# Patient Record
Sex: MALE | URBAN - METROPOLITAN AREA
[De-identification: names, ages, dates, MRNs, and addresses within clinical notes are randomized per-mention and may not be internally consistent; named-entity substitution may affect disease eponyms.]

---

## 2019-09-27 LAB
CREATININE, URINE: 200
MICROALBUMIN/CREAT 24H UR: 150 MG/G{CREAT}
MICROALBUMIN/CREAT UR-RTO: >300

## 2020-05-13 LAB
AVERAGE GLUCOSE: 151
CHOLESTEROL, TOTAL: 153 MG/DL
CHOLESTEROL/HDL RATIO: 6.4
HBA1C MFR BLD: 6.9 %
HDLC SERPL-MCNC: 24 MG/DL (ref 35–70)
LDL CHOLESTEROL CALCULATED: 93 MG/DL (ref 0–160)
NONHDLC SERPL-MCNC: ABNORMAL MG/DL
TRIGL SERPL-MCNC: 178 MG/DL
VLDLC SERPL CALC-MCNC: 36 MG/DL

## 2020-09-15 ENCOUNTER — TELEPHONE (OUTPATIENT)
Dept: PHARMACY | Facility: CLINIC | Age: 60
End: 2020-09-15

## 2020-09-15 NOTE — LETTER
Татьяна 2  [address], [address]  Phone: toll free [phone] option [address] [facility]  [address]  [facility] [address]           09/16/20     Dear John Dillard! You have successfully enrolled in the Be Well With Diabetes program for 2020. What you receive  Beginning within the next 1 to 2 weeks, you will begin receiving up to $300 in waived copays for specific medications and pharmacy-related supplies purchased through our home delivery pharmacy, [address] [place] [address]. A list of eligible medications and pharmacy supplies can be found at [employer] under Be Well With Diabetes. In addition, youll receive advice and help from our pharmacists, associate care management team and diabetes educators. (And, if you also participate in the Be Well program, you can earn points and Lifestyle Management or Health Management program credit, if applicable.)    What you need to do  To maintain your benefit this year and remain eligible next year, complete the following requirements:      *Can be satisfied through [facility] Screening on-site. **Requirements to enroll must be completed within 6 months of enrollment date **Pneumonia vaccine is dependent on previous immunization and your age. Remember, program requirements must be completed by deadlines shown. If not, your benefit may be terminated, and you will not be eligible to participate again until the following year. To keep you on track, well review your [employer] account and send reminders for action. (If you dont have a [address], submit documentation to [email or web address] or by fax to [phone].)    Congratulations and thank you for taking steps to improve your health and to Be Well With Diabetes. [address] [facility]  Phone: [phone], option 7  Email: [email or web address] Fax: [phone]

## 2020-09-15 NOTE — TELEPHONE ENCOUNTER
Pharmacy Pop Care Documentation:   [facility] Employee:  Patient is missing the following requirements: DM Program Application; Diagnosis: DM Type One or Type Two; Provider Documentation of DM Visit; A1C. [employer] code sent via email provided. Per patient he faxed his DM Program Application along with his Health Provider Diabetes Screening Form 2 weeks ago and he has the confirmation receipt. I advised him that we have not received his information. Upon further discussion with the patient he faxed the information to an incorrect fax number - per patient he faxed the information to the following number: [phone] which is the phone number listed on the application to contact our department. Patient was given the correct fax number: [phone] along with our e-mail: [email or web address] and asked to send the information as soon as possible. Patient verified understanding but was unsure if he completed the DM Program application and requested that it be e-mailed to him at Rafal Lerner. [email or web address] Maguire Application e-mailed to patient as requested on 9/15/20.     Tristan Rossi, [address] [email or web address]   Department, toll free: [phone], option 7

## 2020-09-16 ENCOUNTER — CLINICAL DOCUMENTATION (OUTPATIENT)
Dept: PHARMACY | Facility: CLINIC | Age: 60
End: 2020-09-16

## 2020-09-16 NOTE — TELEPHONE ENCOUNTER
Dm Program Application and required documentation received. Spoke to patient and confirmed that the DM Program application and required documentation received. 2020 Annual Pharmacist Appointment scheduled for 9/21/20 at 7:30 AM.    Pharmacy Pop Care Documentation:   [facility] Employee:  Patient has completed all the requirements and therefore will be enrolled in the DM Program.    Application received: 9/57/45  Application scanned. Letter mailed to patient.     Vitaly Walden, Via [facility]   Department, toll free: [phone], option 7

## 2020-09-16 NOTE — PROGRESS NOTES
Pharmacy Pop Care Documentation:      The application for [facility] Donald for enrollment into the diabetes management program has been reviewed and accepted on 9/16/20.     Selma Hobson

## 2020-09-16 NOTE — PROGRESS NOTES
Pharmacy Pop Care Documentation:     AVS received for required office visit on: 5/13/20: Emma Saucedo NP

## 2020-09-21 ENCOUNTER — SCHEDULED TELEPHONE ENCOUNTER (OUTPATIENT)
Dept: PHARMACY | Facility: CLINIC | Age: 60
End: 2020-09-21

## 2020-09-21 RX ORDER — LOSARTAN POTASSIUM 100 MG/1
100 TABLET ORAL DAILY
COMMUNITY

## 2020-09-21 RX ORDER — PEN NEEDLE, DIABETIC 30 GX3/16"
NEEDLE, DISPOSABLE MISCELLANEOUS
COMMUNITY

## 2020-09-21 RX ORDER — ISOPROPYL ALCOHOL 0.7 ML/1
SWAB TOPICAL
COMMUNITY

## 2020-09-21 RX ORDER — INSULIN GLARGINE AND LIXISENATIDE 100; 33 U/ML; UG/ML
INJECTION, SOLUTION SUBCUTANEOUS
COMMUNITY

## 2020-09-21 RX ORDER — CARVEDILOL 25 MG/1
25 TABLET ORAL 2 TIMES DAILY
COMMUNITY

## 2020-09-21 RX ORDER — FUROSEMIDE 20 MG/1
20 TABLET ORAL 2 TIMES DAILY
COMMUNITY

## 2020-09-21 RX ORDER — LANCETS
EACH MISCELLANEOUS
COMMUNITY

## 2020-09-21 RX ORDER — ATORVASTATIN CALCIUM 20 MG/1
20 TABLET, FILM COATED ORAL DAILY
COMMUNITY

## 2020-09-21 RX ORDER — GLUCOSAMINE HCL/CHONDROITIN SU 500-400 MG
CAPSULE ORAL
COMMUNITY

## 2020-09-21 NOTE — LETTER
Татьяна 2  [address] [facility], [address]  Phone: [phone], option 2101 [facility]  [address] [place] [address]           09/21/20     Dear Ernesto Gallardo,    Thank you for taking the time to speak with us for the [address] [place] [address] Diabetes Program! Here is a summary of some things we talked about:    Current medications eligible for copay waiver, up to $300, through [address] Delivery:  - losartan, atorvastatin  Orien Josh not covered; if issues getting for $0 with coupon consider changing to covered insulin (Lantus, Jonny Grange) and covered GLP-1 (Victoza, Trulicity, Ozempic)  - Generic insulin pen needles  - Prodigy (or Agamatrix) meter and supplies  - prescription fax requests sent to your provider: Prodigy testing supplies, pen needles, losartan, atorvastatin, furosemide, carvedilol, alcohol swabs. Please follow up if you do not hear anything from the pharmacy within one week  Home delivery pharmacy: [phone], option 0 - please allow 2 weeks for processing and shipping prescriptions. Requirements to be completed: please provide documentation of completed requirements  - Ongoing requirements (due by 12/31/2020): Office visit with provider for diabetes (2nd in 2020), diabetes education (if A1c becomes over 8%), A1c (2nd in 2020), Urine microalbumin, Pneumococcal vaccination: documentation of Pneumovax 23 vaccination and Influenza vaccination for [id number]     Please work with your provider to ensure that the 2020 requirements are completed by the appropriate dates. Please feel free to contact us with questions. (continued on next page)  Thank you,  Татьяна 2 Team  Telephone: [phone] Option #7   Fax: [phone]  Email: [email or web address]

## 2020-09-21 NOTE — LETTER
HOME DELIVERY PRESCRIPTION REQUEST  DIABETES PROGRAM  Prescriber:  [address], [facility] [address] [facility]  Phone: [phone] Fax: [phone]     Patient:  Natalia Desouza 1960  [place]  [address] [place] [address] [place] [id number] (home)      Rationale:   Patient is enrolled in the [facility] Diabetes Management Program. Copays for certain diabetes related medications and supplies will be waived if filled through [address] ([address]) [address] while participating in the program. 90 day supplies are preferred. Covered Medication(s) for Patient[de-identified]    Medication: atorvastatin 20 mg tab      Si tab PO daily                      #: 90     R: 1  Medication: losartan 100 mg tab          Si tab PO daily                      #: 90     R: 1  Medication: 31G x 5 mm pen needles  Sig: Use once daily as directed   #: 100    R: 3       Prescriber Response:    Prescription(s) approved (please [place] one):  YES  NO    Other response: ________________________________________      ______________________________________   __________________  Authorized By       Date     Pharmacy: 5 [facility]                        Phone:  [phone]    Konstantin Adams, [address] [facility]  Fax:  [phone]    [address]     The information transmitted is intended only for the person or entity to which it is addressed and may contain confidential and/or privileged material. Any review, retransmission, dissemination or other use of, or taking of any action in reliance upon, this information by persons or entities other than the intended recipient is prohibited.  This document contains information covered under the Privacy Act, 5 (a), and/or the [facility] and Accountability Act ([address]) and its various implementing regulations and must be protected in accordance with those provisions. If you received this in error, please contact the sender and delete the material from any computer. HOME DELIVERY PRESCRIPTION REQUEST  Prescriber:  Reena [address], [facility]  Phone: [phone] Fax: [phone]     Patient:  Jannette Storyet 1960  [facility] [address]  [phone] (home)      Rationale:   Patient would like to get maintenance medications from preferred home delivery pharmacy with new insurance. 90 day supplies are preferred. Covered Medication(s) for Patient[de-identified]    Medication: carvedilol 25 mg tab         Si tab PO BID                 #: 180       R: 1  Medication: furosemide 20 mg tab      Si tab PO BID                 #: 180       R: 1  Medication: alcohol 70% prep pads    Sig: use 4 times per day       #: 400       R: 3       Prescriber Response:    Prescription(s) approved (please [place] one):  YES  NO    Other response: ________________________________________      ______________________________________   __________________  Authorized By       Date     Pharmacy: [facility]                        Phone:  [phone]    Konstantin Adams, [facility]  Fax:  [phone]    [address]     The information transmitted is intended only for the person or entity to which it is addressed and may contain confidential and/or privileged material. Any review, retransmission, dissemination or other use of, or taking of any action in reliance upon, this information by persons or entities other than the intended recipient is prohibited. This document contains information covered under the Privacy Act, 5 (a), and/or the [employer] and Accountability Act ([address]) and its various implementing regulations and must be protected in accordance with those provisions.  If you received this in error, please contact the sender and delete the material from any computer.

## 2020-09-21 NOTE — TELEPHONE ENCOUNTER
[facility] Employee Diabetes Program  =================================================================  Natalia Desouza is a 61 y.o. male enrolled in the [facility] Employee Diabetes Program. New enrollee - [address]  Medications:  No current outpatient medications on file. No current facility-administered medications for this visit. Per application:  Atorvastatin 20 mg tablet nightly  Carvedilol 25 mg tab BID  Furosemide 20 mg tab BID  One touch lancets 2-3 times daily  Test strips 2-3 times daily  Pen needles 31G x 5mm  Alcohol pads  Was on Xultophy but is changed to [employer]. Xultophy was not covered so changed to [employer] and says got coupon. Does not have yet so unsure of dose. Per claims:  Losartan 100 mg tab 1 daily (filled 8/12/20) - states taking this  Xultophy (degludec + liraglutide) 100-3.6 mg/mL pen (denied); Victoza?; Soliqua? (Lantus + lixixenatide) - see above    Current Pharmacy: [facility]  Current testing supplies/frequency: discussed options - would like Prodigy  Pen needles/syringes: 31G x 5mm - will need Rx at [facility]    Allergies: Allergies not on file     Vitals/Labs:  BP Readings from Last 3 Encounters:   No data found for BP     Per 9/16/20 scanned document:  /92 mmHg    No results found for: LABMICR, [id number]     Lab Results   Component Value Date    LABA1C 6.9 05/13/2020     Lab Results   Component Value Date    CHOL 153 05/13/2020    TRIG 178 05/13/2020    HDL 24 (A) 05/13/2020    LDLCALC 93 05/13/2020     No results found for: ALT, AST  The ASCVD Risk score (Jelani Esparza et al., 2013) failed to calculate for the following reasons: The systolic blood pressure is missing    The smoking status is invalid    Unable to determine if patient is Non-      No results found for: CREATININE  CrCl cannot be calculated (No successful lab value found. ).      Per 9/16/20 scanned results eGFR 40 mL/min/1.73m2    Immunizations: There is no immunization history on file for this patient. Social History:  Social History     Tobacco Use    Smoking status: Not on file   Substance Use Topics    Alcohol use: Not on file     ASSESSMENT:  Ongoing Program Requirements (Y indicates has completed for the year, N indicates needs to be completed by 12/31/2020): No - OV with provider for DM (2nd) - hospitals has appt next month; did discuss deferring for 2020 requirement but if going asked if he could please provide documentation  Yes - ACC/diabetes educator visit (will need if A1c becomes over 8%)  No - A1c (2nd) - hospitals has appt next month; did discuss deferring for 2020 requirement but if going asked if he could please provide documentation  Yes - Lipid panel  No - Urine microalbumin (discussed getting this as requirement)  No - Pneumococcal vaccination: Pneumovax 23 or documentation - pt states had [facility] at [facility], advised will need documentation to update program records  No - Influenza vaccination for Fall 2020  Yes - Medication adherence over 70% n/a since on insulin  Yes - On statin or contraindication(s) atorvastatin  Yes - On ACEi/ARB or contraindication(s) losartan     Formulary Medication Review:  Non-formulary or medications with cost-effective alternatives: would like to change to preferred glucometer/supplies. Current medications eligible for copay waiver, up to $300, through [address] [place]:  - losartan, atorvastatin  - Soliqua not covered; if issues with coupon consider changing to covered insulin (Lantus, Tresiba) and covered [id number] (Victoza, Trulicity, Ozempic)  - Generic ([facility] pharmacy-stocked) insulin syringes and pen needles  - Agamatrix or Prodigy meter and supplies     Diabetes Care:   - Glycemic Goal: <7.0%.  Is at blood glucose goal.    Other Considerations:  - Blood Pressure Goal: BP less than 140/90 mmHg due to history of DM: Is not at blood pressure goal (with 1 reading available). On losartan 100 mg daily, carvedilol 25 mg BID, furosemide 20 mg daily. To f/u for recheck at visit  - Lipids: is on moderate intensity statin with LDL under 100 mgd/L. Unable to calculate ASCVD risk score. PLAN:  - Consideration(s) for provider: fax sent to provider Taylor Springer NP) - see letters  · Prodigy - up to 3 times daily  · 90 day Rxs to [facility]: atorvastatin, losartan, pen needles  · Patient would also like maintenance Rxs to [facility]: alcohol swabs (4 times per day), furosemide, carvedilol; (Ange Nancie already being filled by [facility])  - DM program gaps identified:   · Ongoing requirements: OV with provider for DM (2nd), ACC/diabetes educator visit (if A1c over 8%), A1c (2nd), Urine microalbumin, Pneumococcal vaccination: documentation of Pneumovax 23 vaccination and Influenza vaccination for [id number]   - Education to patient: as above and:   · How to use [facility] including calling 7-10 days in advance to initiate refills  · Soliqua coverage (not covered) but if not covered by coupon fill for individual agents  · Adjusted requirements due to pandemic but encouraged to complete since upcoming visit  · Letter sent as pt does not have active MyChart  - Follow up: PCP for identified gaps or as scheduled below  - Upcoming appointments:   Future Appointments   Date Time Provider Pankaj Trevino   9/21/2020  7:30 AM SCHEDULE, [facility], PharmD, [address] [facility] Pharmacist  Dept: [phone] (toll free [phone], option 7)     For Pharmacy Admin Tracking Only    PHSO: Yes  Total # of Interventions Recommended: 2  - Updated Order #: 1 Updated Order Reason(s):  Other  Recommended intervention potential cost savings: 1  Total Interventions Accepted: 1  Time Spent (min): 60

## 2020-09-21 NOTE — LETTER
PRESCRIPTION REQUEST  DIABETES PROGRAM  Prescriber:  [address], [facility]  Phone: [phone] Fax: [phone]     Patient:  Roger Leaver 1960  [facility]  [address] [place] [id number] (home)      Rationale:   Davina Kothari is the preferred blood glucose monitor. Meter, strips and lancets will be covered at $0 copay through the [facility] Diabetes Management Program.       Rx: Prodigy Blood Glucose Monitor   #: 1  Refills: 0  Rx: Prodigy Blood Glucose Test Strips   #: 300  Refills: 3  Rx: Prodigy Lancets     #: 300  Refills: 3    Directions: Use 3 time(s) daily or as directed to test blood glucose         Prescriber Response:    Prescription(s) approved (please [place] one):  YES  NO    Other response: ________________________________________      ______________________________________   __________________  Authorized By       Date     Pharmacy: [facility]                        Phone:  [phone]    Konstantin Adams, [address] [facility]  Fax:  [phone]    [address]     The information transmitted is intended only for the person or entity to which it is addressed and may contain confidential and/or privileged material. Any review, retransmission, dissemination or other use of, or taking of any action in reliance upon, this information by persons or entities other than the intended recipient is prohibited. This document contains information covered under the Privacy Act, 5 (a), and/or the [employer] and Accountability Act ([address]) and its various implementing regulations and must be protected in accordance with those provisions. If you received this in error, please contact the sender and delete the material from any computer.

## 2020-09-23 NOTE — TELEPHONE ENCOUNTER
Response received from [facility] confirming pt is able to receive Soliqua at $0 copay with coupon.

## 2020-11-19 ENCOUNTER — TELEPHONE (OUTPATIENT)
Dept: PHARMACY | Facility: CLINIC | Age: 60
End: 2020-11-19

## 2020-11-19 NOTE — TELEPHONE ENCOUNTER
Pharmacy Pop Care Documentation:   Called patient with reminder for requirements for Diabetes Management Program.     According to our records, patient is missing the following requirement(s) that must be completed by December 31, 2020:     Yearly urine microalbumin test - we will honor 2019 results     Patient not available at the time of call. Left message on home TAD with the above information. [employer] account is pending. Letter sent.      Alonso Garcia, Via [facility]   Department, toll free: [phone], option 7

## 2020-11-19 NOTE — LETTER
Татьяна 2  [address], [address]  Phone: toll free [phone] option 21[address] [facility] [place] [address]           11/19/20     Dear Wing Salguero,    This is just a friendly reminder regarding your enrollment in the [facility] Diabetes Management Program.     According to our records, you are missing the following requirement(s) that must be completed by December 31, 2020 to maintain enrollment in the  Diabetes Management Program and to be re-enrolled for 2021:      Yearly urine microalbumin test - we will honor 2019 results    Sid Singleton will have to submit documentation of completion of requirements if your Physician/diabetes educator/ambulatory care coordinator does not use the [facility] electronic charting system or if you have your Vaccinations, lab tests or urine tests done outside of [facility]. Return the documentation to [email or web address] or by fax at [phone]     If requirements(s) are not met by December 31, 2020 you may be disqualified from the program and the credit valued between $300 to $600 towards your diabetic medications and supplies will be revoked. You will be able to reapply the following calendar year. [employer]   [phone] Option #7   Email: [email or web address]   Fax Number: [phone]

## 2020-12-14 ENCOUNTER — TELEPHONE (OUTPATIENT)
Dept: PHARMACY | Facility: CLINIC | Age: 60
End: 2020-12-14

## 2020-12-14 NOTE — TELEPHONE ENCOUNTER
**2nd Attempt to contact patient for missing requirements for the DM Program**    Pharmacy Pop Care Documentation:   Called patient with reminder for requirements for Diabetes Management Program.     According to our records, patient is missing the following requirement(s) that must be completed by December 31, 2020:      Yearly urine microalbumin test - we will honor 2019 results     Patient not available at the time of call. Left message on home TAD with the above information.     [employer] account is pending.   Letter sent on 11/19/20.     Trixie Grant, Via [employer]   Department, toll free: [phone], option 7

## 2020-12-14 NOTE — LETTER
Татьяна Mckeon  [address], [address]  Phone: toll free [phone] option 130 [facility]  [address] [place] [address]           12/21/20     Dear Stuart Zaman,    Thanks so much for taking the first step towards better health. Congratulations! You have completed the requirements needed to maintain enrollment in the [facility] Diabetes Management Program for 2020 and you will automatically be re-enrolled into the [facility] Diabetes Management Program for 2021. You do not need to re-apply for 2021. Your waived copay allowance for specific medications and pharmacy-related supplies received via mail will reset to $600 on 1/01/2021. To maintain your benefit throughout the year and be eligible to receive the benefit next year, you must make sure you complete all the following ongoing requirements:               Requirements to be completed between Jan. 1 and June 30:     1. Visit with your physician (First yearly visit)  2. Meet with a Татьяна Mckeon Team Member by phone or in person at a hospital location (This visit may be conducted prior to the start of the requirements period.)  3. First A1C               Requirements to be completed between July 1 and Dec. 31  1. Visit with your physician (Second yearly visit)  2. Second A1C  3. Flu vaccination               Requirements to be completed throughout year:     1. Lipid panel (once yearly)  2. Urine albumin (once yearly)  3. Pneumonia Vaccination (as indicated)  4. Take diabetes medication as prescribed as well as cholesterol (Statin) or high blood pressure (ACE/ARB), if needed. 5. 70% adherence is required of diabetes medications  6. Provide documentation if cholesterol and/or high blood pressure medications are not needed.                Requirements if A1C is greater than 8 percent: Engage with a [facility] diabetes educator at one of our hospital locations or an ambulatory care coordinator by phone, if your A1c is greater than 8 percent. You will have to submit documentation of completion of requirements if your Physician/diabetes educator/ambulatory care coordinator does not use the [facility] electronic charting system or if you have your lab/urine tests done outside of [facility]. Return the documentation to [email or web address] or by fax at [phone]     If requirements(s) are not met by the dates listed above you will be disqualified from the program and the credit valued at $600 towards your diabetic medications and supplies will be revoked. You will be able to reapply the following calendar year. Татьяна Mckeon Team   [phone] Option #7   Email: [email or web address]   Fax Number: [phone]

## 2020-12-18 NOTE — TELEPHONE ENCOUNTER
Patient called to verify receipt of urine labs sent from office. Verified 2019 urine results in Clinical pool and will be attached to chart. Patient has completed 2020 requirements.

## 2020-12-21 NOTE — TELEPHONE ENCOUNTER
2019 Urine Albumin Results Received. Patient is up to date with requirements for the DM Program and will be re-enrolled into the program for 2021. Letter mailed to patient.

## 2021-01-06 ENCOUNTER — TELEPHONE (OUTPATIENT)
Dept: PHARMACY | Facility: CLINIC | Age: 61
End: 2021-01-06

## 2021-01-06 NOTE — TELEPHONE ENCOUNTER
Called patient to schedule pharmacist appointment to discuss medications for Diabetes Management Program.     Scheduled 1/13 at [address] [place] [address].   Pharmacy Leo Sandoval [facility]  Department, toll free: [phone], option 7

## 2021-01-13 ENCOUNTER — SCHEDULED TELEPHONE ENCOUNTER (OUTPATIENT)
Dept: PHARMACY | Facility: CLINIC | Age: 61
End: 2021-01-13

## 2021-01-13 NOTE — TELEPHONE ENCOUNTER
[facility] Employee Diabetes Program - Be Well With Diabetes  =================================================================  Namita Jimenez is a 64 y.o. male enrolled in the [address] [place] [address] Diabetes Program. Patient provided Zachary Landry with verbal consent to remain in the program for this year. Patient enrolled 10/01/2020. Medications:  Medication Sig Notes    atorvastatin (LIPITOR) 20 MG tablet Take 20 mg by mouth daily     carvedilol (COREG) 25 MG tablet Take 25 mg by mouth 2 times daily     furosemide (LASIX) 20 MG tablet Take 20 mg by mouth 2 times daily     Alcohol Swabs (ALCOHOL WIPES) 70 % PADS Use 4 times daily     blood glucose monitor strips Use to test blood sugar 2 to 3 times daily     Lancets Thin MISC Use to test blood sugar 2 to 3 times daily     Insulin Pen Needle (PEN NEEDLES) 31G X 5 MM MISC Use as directed     losartan (COZAAR) 100 MG tablet Take 100 mg by mouth daily     Insulin Glargine-Lixisenatide (SOLIQUA) 100-33 UNT-MCG/ML SOPN Use as directed. Allergies: Penicillin, sulfa antibiotics, strawberry    Current Pharmacy: [facility]  Current testing supplies/frequency: Prodigy BID  Pen needles/syringes: getting through [facility]    Allergies: Allergies   Allergen Reactions    Penicillins Hives    Strawberry Flavor Hives    Sulfa Antibiotics Hives      Vitals/Labs:  BP Readings from Last 3 Encounters:   No data found for BP     No results found for: LABMICR, [id number]  Lab Results   Component Value Date    LABA1C 6.9 05/13/2020     Lab Results   Component Value Date    CHOL 153 05/13/2020    TRIG 178 05/13/2020    HDL 24 (A) 05/13/2020    LDLCALC 93 05/13/2020     No results found for: ALT, AST  The ASCVD Risk score (Kylie Weir et al., 2013) failed to calculate for the following reasons:     The systolic blood pressure is missing    The smoking status is invalid    Unable to determine if patient is Non-      No results found for: CREATININE  CrCl cannot be calculated (No successful lab value found. ). From scanned results:  Date: 5/19/20  Scr 1.8   eGFR 40    Immunizations:  Immunization History   Administered Date(s) Administered    Influenza, MDCK Quadv, IM, PF (Flucelvax 4 yrs and older) 10/10/2020      Social History:  Social History     Tobacco Use    Smoking status: Not on file   Substance Use Topics    Alcohol use: Not on file     ASSESSMENT:  Initial Program Requirements (Y indicates has completed for the year, N indicates needs to be completed by 7/1/2021): No - Office Visit with provider for DM (1st)  No - A1c (1st)     Ongoing Program Requirements (Y indicates has completed for the year, N indicates needs to be completed by 12/31/2021): No - Office Visit with provider for DM (2nd)  n/a - ACC/diabetes educator visit (if A1c over 8%)  No - A1c (2nd)  No - Lipid panel  No - Urine microalbumin  No - Pneumococcal vaccination: Patient states he received November 2019. He will fax confirmation of this vaccine. No - Influenza vaccination for Fall 2021  n/a - Medication adherence over 70%  Yes - On statin or contraindication(s) atorvastatin 20 mg  Yes - On ACEi/ARB or contraindication(s) losartan 100 mg tablets     Formulary Medication Review:  Non-formulary or medications with cost-effective alternatives: Terra Holland, has used copay savings card and paid $0 in December 2020. Discussed if has troubles with this in the future can call if needs assistance looking for formulary options. Current medications eligible for copay waiver, up to $600, through [employer]:  - atorvastatin, furosemide, losartan  - Generic ([facility] pharmacy-stocked) insulin syringes and pen needles  - Agamatrix or Prodigy meter and supplies  - Dexcom G6 supplies  - Freestyle Alin supplies     Diabetes Care:   Type 2 DM   - Glycemic Goal: <7.0%.  Is at blood glucose goal.  - Any episodes of hypoglycemia? No   Treatment of hypoglycemia (low blood sugars): if sugar is below 80 mg/dL, treat with 15 grams of simple sugar [rapid acting carb] (3-4 glucose tablets, 4 oz of regular juice, 1/2 can of pop, 5 sugar-containing candies, 1 Tablespoon of honey). RECHECK in 15 minutes. If above 80 mg/dL, have small meal or snack. If not above 80 mg/dL, repeat the 15 grams of simple carbs until above 80 mg/dL. - Eye exam current (within one year): yes  - Foot exam current (within one year): yes  - Current exercise: walks almost daily for 20-30 minutes    Last Refills:  Soliqua: 12/21/20  Pen needles: 10/12/20  Losartan 100 mg: 10/12/20  Furosemide: 10/12/20  Carvedilol:10/12/20  Atorvastatin: 10/12/20  Prodigy meter and supplies: 9/30/20    PLAN:  - DM program gaps identified:   · Initial requirements: Office Visit with provider for DM (1st) and A1c (1st)   · Ongoing requirements: Office visit with provider for DM (2nd), ACC/diabetes educator visit (if A1c over 8%), A1c (2nd), Lipid panel, Urine microalbumin, Pneumococcal vaccination: [id number], Influenza vaccination for [id number] and Medication adherence over 70%   - Education to patient: MyChart Activation Code sent to patient, Addressed diet and exercise, Discussed foot care, Reminded to get yearly retinal exam and Discussed ways to avoid symptomatic hypoglycemia   - Follow up: endocrinologist  - Upcoming appointments:   No future appointments.     Teagan EncisoD, [facility], [address]  Direct: [phone]  Department, toll free: [phone], option 7     CLINICAL PHARMACY CONSULT: MED RECONCILIATION/REVIEW ADDENDUM  For Pharmacy Admin Tracking Only  PHSO: Yes  Total # of Interventions Recommended: 0  Recommended intervention potential cost savings: 1  Total Interventions Accepted: 0  Time Spent (min): 20

## 2021-01-13 NOTE — LETTER
Татьяна [address]  [address], [address]  Phone: [phone], option [address] [facility]  [address] [place] [id number]           01/13/21     Dear Namita Jimenez,    You are currently enrolled in the Be Well with Diabetes program.    Current medications eligible for copay waiver, up to $600, through [email or web address] [place]:  - atorvastatin, furosemide, losartan  - Generic ([facility] pharmacy-stocked) insulin syringes and pen needles  - Agamatrix or Prodigy meter and supplies    Home Delivery pharmacy: [phone], option 0 - please allow 2 weeks for processing and shipping prescriptions    To sign up for an online [employer] [facility] account, you can visit this website: [email or web address] or search for \"[facility]\" in your mobile phone's marc store. You will need one of your prescription numbers, mobile phone number associated with your home delivery, and email address to get started. If you have any questions/issues with making an account, you can email [email or web address] or call [phone] for assistance. Please work with your provider to ensure that the 2021 requirements are completed by the appropriate dates. - Initial Requirements due by 07/01/2021: Visit with provider for DM (1st) and A1c (1st)   - Ongoing Requirements due by 12/31/2021: Visit with provider for DM (2nd), ACC/diabetes educator visit (if A1c over 8%), A1c (2nd), Lipid panel, Urine microalbumin, Pneumococcal vaccination: [id number], Influenza vaccination for [id number] and Medication adherence over 70%   *Documentation of any requirements completed or reviewed outside of the [facility] record will need to be faxed or emailed (fax/email info below).     Thank you,  Татьяна 2 Team  Telephone: [phone], Option #7 Fax: [phone]  Email: [email or web address]

## 2021-06-11 ENCOUNTER — TELEPHONE (OUTPATIENT)
Dept: PHARMACY | Facility: CLINIC | Age: 61
End: 2021-06-11

## 2021-06-28 ENCOUNTER — CLINICAL DOCUMENTATION (OUTPATIENT)
Dept: PHARMACY | Facility: CLINIC | Age: 61
End: 2021-06-28

## 2021-06-28 NOTE — PROGRESS NOTES
Pharmacy Pop Care Documentation:     AVS received for required office visit on: 5/14/21: Angelo Hare per Care Everywhere

## 2021-07-14 ENCOUNTER — TELEPHONE (OUTPATIENT)
Dept: PHARMACY | Facility: CLINIC | Age: 61
End: 2021-07-14

## 2021-07-14 NOTE — TELEPHONE ENCOUNTER
Pharmacy Pop Care Documentation:   Called patient with reminder for requirements for Diabetes Management Program.     According to our records, patient is missing the following requirement(s) that must be completed by July 1st 2021:     · 1st 2021 A1C        Spoke to patient at mobile number and advised them of the above information. Patient verified understanding. Stated he had it done and took fax number for provider's office to fax.        Tonja Delacruz, [address]   Department, toll free: [phone], option 7

## 2021-11-02 ENCOUNTER — TELEPHONE (OUTPATIENT)
Dept: PHARMACY | Facility: CLINIC | Age: 61
End: 2021-11-02

## 2021-11-02 NOTE — LETTER
Татьяна 2  [address] [facility], [address]  Phone: toll free [phone], Option #3  Fax: [address] [facility]  [address] [place] [id number]           11/02/21     Dear Randee Mixon,     You are currently enrolled in the [facility] Be Well with Diabetes Program. Our records indicate that we are missing the requirements listed below. If these requirements are not completed by December 31, 2021, then you may be disenrolled from the program:      Second diabetes visit with your physician in 2021  Gonzalez Turcios [place] [address] Second A1c result in 2021   Lipid panel   Urine protein/microalbumin   Pneumococcal vaccination   Influenza vaccination for the 2021    You must submit documentation of completion of requirements if your provider does not use the [facility] electronic charting system or if completed outside of the system. Return the documentation to [email or web address] or by fax at [phone]. Please call our office so we can discuss the missing requirements with you to ensure that you will remain enrolled in the [address] [place] [address] Be Well with Diabetes Program.    Thank you,    Татьяна 2 Team   Phone: toll free [phone], Option #3  Email: [email or web address]   Fax Number: [phone]

## 2022-02-02 ENCOUNTER — CLINICAL DOCUMENTATION (OUTPATIENT)
Dept: PHARMACY | Facility: CLINIC | Age: 62
End: 2022-02-02

## 2022-02-02 NOTE — PROGRESS NOTES
Pharmacy Pop Care Documentation:     Javier Manley is being removed from the diabetes management program for the following reason(s): Lipid panel drawn yearly, Received yearly flu and pneumococcal vaccination (as indicated), Second A1c result and Second MD Visit for DM    Phuong Landin

## 2024-02-12 NOTE — LETTER
[address], [address]  Phone: toll free [phone], option 3  Fax: [address]  [address]  [facility] [address]           02/02/22     Dear Shefali Chambers,     We regret to inform you that you have been disqualified from Be Well With DM Program for not completing the following requirements:     Lipid panel drawn yearly, Received yearly flu vaccination, Second A1c result and Second MD Visit for DM    You will not receive the copay waiver up to $600 towards your diabetic medications and supplies in 2022. If you qualify once again, you will be eligible to reapply to the Be Well With DM Program the following calendar year. Thank you,    Clinical Pharmacy Team   [phone], option #3  Email: [email or web address]   Fax Number: [phone] Detail Level: Detailed